# Patient Record
Sex: MALE | Race: WHITE | NOT HISPANIC OR LATINO | Employment: OTHER | URBAN - METROPOLITAN AREA
[De-identification: names, ages, dates, MRNs, and addresses within clinical notes are randomized per-mention and may not be internally consistent; named-entity substitution may affect disease eponyms.]

---

## 2018-01-10 NOTE — PROGRESS NOTES
Discussion/Summary    PATIENT EDUCATION RECORD   Living Well with Diabetes Class 3 Education Content:Oral/Injectable medication, Prevention, Short-term complications, Long-term complications, Foot care, Sick day management (illness and stress), Travel       History of Present Illness  Patient attended Living Well with Diabetes class 3  Results/Data  Encounter Results   DSMT/MNT Time Record [id number] 12:00AM Gisell Calle     Test Name Result Flag Reference   Date of Service 5/18/2016     Start - Stop Time 1:00-3:00PM     Total MInutes 120 minutes     Group Or Individual Instruction DSMT-G       Future Appointments    Date/Time Provider Specialty Site   05/25/2016 01:00 PM Kym Mckenna RD Diabetes Educator [facility] ENDOCRINOLOGY   05/27/2016 11:20 AM SANJEEV Ng   Orthopedic Surgery [facility] ORTHO SPECIALISTS   [facility] 08[facility]     Signatures   Electronically signed by : Oniel Rondon [facility]; May 19 2016 11:29AM EST                       (Author)    Electronically signed by : SANJEEV Dahl ; May 31 2016 10:30AM EST

## 2018-01-11 NOTE — PROGRESS NOTES
Plan    1  DSMT/MNT Time Record; Status:Complete;   Done: [id number] 05:36PM    Discussion/Summary    PATIENT EDUCATION RECORD   Living Well with Diabetes Class 1 Education Content: What is diabetes, Types of diabetes, How is diabetes diagnosed, Management skills, Role of exercise, Glucose monitoring, Target range goals        History of Present Illness  Patient attended Living Well with Diabetes class 1        Results/Data  Encounter Results   DSMT/MNT Time Record [id number] 05:36PM Shade Shah     Test Name Result Flag Reference   Date of Service 5/4/2016     Start - Stop Time 1:00-3:00PM     Total MInutes 120 minutes     Group Or Individual Instruction DSMT-G       Future Appointments    Date/Time Provider Specialty Site   05/11/2016 01:00 PM Moose Laird RD Diabetes Educator [facility] ENDOCRINOLOGY   05/25/2016 01:00 PM Moose Laird RD Diabetes Educator [facility] ENDOCRINOLOGY   05/18/2016 01:00 PM Shade Shah RD, [place] Diabetes Educator [facility] ENDOCRINOLOGY   06/20/2016 08:30 AM Ariel Bellamy [facility] Endocrinology [facility] ENDOCRINOLOGY     Signatures   Electronically signed by : Ryan Pritchard; May  4 2016  5:38PM EST                       (Author)    Electronically signed by : SANJEEV Stevens ; May  5 2016  9:33AM EST

## 2018-01-11 NOTE — PROGRESS NOTES
Plan    1  DSMT/MNT Time Record; Status:Complete;   Done: [id number] 12:00AM    Discussion/Summary    PATIENT EDUCATION RECORD   Indication for Services: type 2 Diabetes Mellitus  He is ready to learn  He has no barriers to learning  Diabetes Disease Process:   He understands the pathophysiology of diabetes: Method: Instruction  Response: Verbalizes Understanding   Discussed patient's type of diabetes: Method: Instruction  Response: Verbalizes Understanding   Discussed diagnosis criteria: Method: Instruction  Response: Verbalizes Understanding   Discussed treatment goals: Method: Instruction  Response: Verbalizes Understanding   Discussed benefits of control: Method: Instruction  Response: Verbalizes Understanding   Discussed treatment options: Method: Instruction  Response: Verbalizes Understanding   Healthy Eating:   Discussed general nutrition topics: Method: Instruction  Response: Verbalizes Understanding   Being Active:   Stated the benefits of exercise: Method: Instruction  Response: Verbalizes Understanding   Discussed "exercise guidelines": Method: Instruction  Response: Verbalizes Understanding   His blood glucose targets are: Pre-meal target , Post-meal target < 180 and HS target 100-140  Monitoring:   Discussed target blood glucose ranges: Method: Instruction and Handout  Response: Verbalizes Understanding  Discussed target hemoglobin A1c: Method: Instruction  Response: Verbalizes Understanding  Discussed Finger stick testing: Method: Instruction and Demonstration  Response: Verbalizes Understanding and Returns Demonstration  Discussed proper stick techniques: Method: Instruction and Demonstration  Response: Verbalizes Understanding and Returns Demonstration  Discussed testing times: Method: Instruction  Response: Verbalizes Understanding  Discussed safe lancet disposal: Method: Instruction  Response: Verbalizes Understanding  Discussed meter : Method: Instruction  Response: Verbalizes Understanding  Discussed options for record keeping: Method: Instruction  Response: Verbalizes Understanding  Discussed reporting of readings to M D : Method: Instruction  Response: Verbalizes Understanding  He was instructed how to use the meter  He is currently using a One Touch Ultra MIni meter  Taking Medication:   Discussed Oral Medication[de-identified] Method: Instruction  Response: [facility] [employer]  Stated name,dose, and timing of oral meds  Method: Instruction  Response: [employer]  Stated side effects/precautions with diabetes meds  Method: Instruction  Response: [employer]  He is taking  biguanides and SGLT2 Inhibitor  Problem Solving: He is able to state the symptoms, prevention, and treatment of hypoglycemia, but He is not on medications that cause hypoglycemia   Hypoglycemia: Stated definition and causes: Method: Instruction  Response: Verbalizes Understanding   Described signs and symptoms: Method: Instruction and Handout  Response: Verbalizes Understanding   Discussed prevention: Method: Instruction  Response: Verbalizes Instruction   Discussed treatment: Method: Instruction  Response: Verbalizes Instruction   Hyperglycemia: Stated definition and causes: Method: Instruction  Response: Verbalizes Understanding   Described signs and symptoms: Method: Instruction and Handout  Response: Verbalizes Instruction   Reducing Risk:   Discussed long term complications- prevention, assessment, and monitoring  Method: Instruction  Response: [employer]  Discussed yearly recommended exams  Method: Instruction  Response: [employer]  Education Plan/Path:  He needs the Living Well Class  May He was given the following educational materials: Know Your Blood Glucose Numbers, The 15/15 Rule for Treating Low Blood Sugar and Hyperglycemia/Hypoglycemia         Chief Complaint  Patient with T2DM seen for class assessment per MD request       History of Present Illness  Patient will be attending Living Well with Diabetes class in May at [facility] SPECIALTY [facility]  Results/Data  Encounter Results   DSMT/MNT Time Record 25Apr2016 12:00AM Kody Thomas     Test Name Result Flag Reference   Date of Service 4/25/2016     Start - Stop Time 1:00-2:00PM     Total MInutes 60 minutes     Group Or Individual Instruction DSMT-I       Future Appointments    Date/Time Provider Specialty Site   04/27/2016 01:15 PM Kody Thomas, RD, LDN Diabetes Educator [address] ENDOCRINOLOGY   05/06/2016 01:20 PM SANJEEV Larkin   Orthopedic Surgery [facility] ORTHO SPECIALISTS   06/20/2016 08:30 AM Humberto Boggs, [address] Endocrinology [address] ENDOCRINOLOGY     Signatures   Electronically signed by : Leni Blanco; Apr 26 2016 10:55AM EST                       (Author)    Electronically signed by : SANJEEV Bell ; Apr 27 2016 11:21AM EST

## 2018-01-12 NOTE — PROGRESS NOTES
Plan    1  DSMT/MNT Time Record; Status:Complete;   Done: [id number] 12:00AM    Discussion/Summary    PATIENT EDUCATION RECORD   Indication for Services: type 2 Diabetes Mellitus  He is ready to learn  He has no barriers to learning  Healthy Eating:   Discussed general nutrition topics: Method: Instruction  Response: Verbalizes Understanding   Discussed importance of meal timing/consistency: Method: Instruction  Response: Verbalizes Understanding   Discussed nutrient types ( Cho/Fat/Protein): Method: Instruction and Handout  Response: Verbalizes Understanding   Discussed portion sizes: Method: Instruction and Handout  Response: Verbalizes Understanding   Discussed alcohol consumption: Method: Instruction  Response: Verbalizes Understanding   Discussed food label reading: Method: Instruction  Response: Verbalizes Understanding  Provided food diary and instructions on use: Method: Instruction and HandoutResponse: Verbalizes Understanding   Provided meal planning: Method: Instruction and Handout  Response: Verbalizes Understanding His current weight is 254  His keal needs are ~2000 calories  His CHO's per meal are 60 grams  He/She was provided a meal plan for: fixed carbohydrates and weight loss  Discussed weight management/weight loss: Method: Instruction  Response: Verbalizes Understanding His weight goal is Lose 10% of present body weight  Discussed fat intake and choices: Method: Instruction and Handout  Response: Verbalizes Understanding   Discussed basic carbohydrate counting: Method: Instruction and Handout  Response: Verbalizes Understanding   Being Active:   Stated the benefits of exercise: Method: Instruction  Response: Verbalizes Understanding   Discussed "exercise guidelines": Method: Instruction  Response: Verbalizes Understanding He was given the following educational materials: [employer] book, [employer] 2000 calories calories and Calorie and Carbohydrate Tracking Books/Websites/Phone Apps   Chief Complaint  Patient with T2DM seen for diet consult per MD request       History of Present Illness  Patient reports a 14 pound weight loss in the past 3 weeks  He attributes the weight loss to dietary changes (has been avoiding potato chips, larose, whole eggs, pork roll and cheese breakfast sandwiches, liverwurst, etc)  Problems identified in food recall include inconsistent carbohydrate intake at meals, excess calories coming from high fat food choices (sausage), excess calories coming from Etoh consumption, low intake of plant based foods, whole grains and low fat dairy and general lack of balance  Provided patient with 2000 calorie meal plan to assist with consistency, balance and portion control  Shireen Lua to keep his carbohydrate intake to 60 grams of CHO per meal and 15 grams per between meal snack to assist with glycemic control  Suggested he limit lean protein intake to 10 ounces a day and limit added fat to 5 servings daily to assist with lipid management and calorie control  Patient would benefit from increasing his intake of non-starchy vegetables  Discussed safe weight loss and encouraged patient to keep his weight loss to no more than 2 pounds a week  Encouraged patient to initiate regular exercise and work his way up to a minimum of 150 minutes a week  Patient agreed to keep daily food logs  Patient did not schedule follow-up at this time  He will attend Living Well with Diabetes classes in May  RD will remain available for further dietary questions/concerns  This is his initial assessment    Present at session: patient  and spouse    Medical Diagnosis/Reason for Referral:T2DM  He has no special learning needs  His caloric needs are ~2000 calories  Recent weight change: 14 pound weight loss in past 3 weeks  Spouse  shops for food  Spouse  cooks the food  Exercise routine:  Patient does not exercise beyond his daily activities     He eats breakfast at  7-7:30AM AM Egg whites, 4oz sausage, 1/2 of an orange or plum, coffee with 2% milk; was having 4 slices of larose with eggs or pork roll and cheese on a muffin or just cold cuts rolled together, coffee   He snacks at 10:00AM AM Nut[employer] granola bar, club soda; was having potato chips   He eats lunch at  12-1:00PM PM Salad with lettuce, tomato, onion, olives, turkey or ham and 2 tblsp [place] dressing, 1-2 cups soup; was having 2 slices of wheat bread with cold cuts and mustard   He snacks at 3:00PM PM 3-4 tortilla chips, salsa OR yogurt smoothie made with 1 cup yogurt, 3 large strawberries, 1/4 banana and 2 slices of apple   He eats dinner at  6:00PM PM Stir [place] veggies with chicken or shrimp or steak OR 2 chicken thighsand 1 small potato OR 1 cup wheat pasta with meatballs; was having mainly meat and potatoes  9:30PM at bedtime 1 piece of wheat toast with cream cheese or SF jam; 2x a week will have 2 shots of gin; patient was "snacking on anything around" prior to making changes     [address] and nutrition related knowledge deficit related to  lack of prior exposure to accurate nutrition related information  As evidenced by  no prior knowledge of need for food and nutrition related recommendations  Medical Nutrition Therapy Intervention: Plate Method, Carbohydrate counting, Meal planning, Strategies to reduce fat intake, Individualized meal plan, Strategies to increase the intake of plant based foods, Strategies to monitor portion control, Label reading, Meal timing, Exercise Guidelines, Behavior modification strategies and Weight/BMI Goals  His comprehension was good   His motivation was good   His compliance was good   Goals:  1  60 grams CHO per meal and 15 grams per between meal snack  2  Make low fat food choices when possible  3  Initiate aerobic exercise        Social History    · Denied: History of Alcohol use   · Daily caffeine consumption   · Never a smoker    Vitals  Signs [Data Includes: Current Encounter]   Recorded: 51Yxs9309 02:01PM   Weight: 254 lb   BMI Calculated: 37 51  BSA Calculated: 2 29    Results/Data  Encounter Results   DSMT/MNT Time Record [id number] 12:00AM Chilo Kerns     Test Name Result Flag Reference   Date of Service 4/27/2016     Start - Stop Time 1:15-2:15PM     Total MInutes 60 minutes     Group Or Individual Instruction MNT-I       Future Appointments    Date/Time Provider Specialty Site   06/20/2016 08:30 AM Stevo Valdez [facility] Endocrinology [address] [facility] ENDOCRINOLOGY     Signatures   Electronically signed by : Usha Sanchez [facility]; Apr 28 2016  3:09PM EST                       (Author)    Electronically signed by : SANJEEV Lentz ; Apr 29 2016  8:31AM EST

## 2018-01-16 NOTE — PROGRESS NOTES
Plan    1  DSMT/MNT Time Record; Status:Complete;   Done: 46TIB1194 06:00PM    Discussion/Summary    PATIENT EDUCATION RECORD   Living Well with Diabetes Class 4 Education Content: Types of cholesterol, Dietary sources of cholesterol, Types of fat, Types of fiber, Reading food labels- in depth, Healthy choices when dining out        Chief Complaint  Patient attended LWD class 4 this evening      End of Encounter Meds    1  Invokana 300 MG Oral Tablet; Take one tablet daily; Therapy: 54QVO3347 to (Evaluate:24Mar2017)  Requested for: 36RLT3109; Last   Rx:29Mar2016 Ordered   2  Lisinopril 10 MG Oral Tablet; TAKE 1 TABLET DAILY; Therapy: 20Jun2016 to (Evaluate:55Uaj8362)  Requested for: 20Jun2016; Last   Rx:20Jun2016 Ordered   3  MetFORMIN HCl - 1000 MG Oral Tablet; TAKE 1 TABLET TWICE DAILY  Requested for:   26TZF0153; Last Rx:29Mar2016 Ordered   4  OneTouch Ultra 2 w/Device Kit; Using to check sugars 3-4 times daily as directed; Therapy: 04Apr2016 to (Last Rx:06Apr2016)  Requested for: 07Apr2016 Ordered   5  OneTouch Ultra Blue In [employer]; Test 3 times daily; Therapy: 79SWB7198 to (Evaluate:69Nkn9320)  Requested for: 412 5473; Last   Rx:06Apr2016 Ordered    6  Atorvastatin Calcium 20 MG Oral Tablet; Take 1 tablet daily; Therapy: 96MCC1512 to (Mary Grace Moser)  Requested for: 86ZHT4382; Last   Rx:29Mar2016 Ordered    7  Atenolol-Chlorthalidone 50-25 MG Oral Tablet; Therapy: (Recorded:29Mar2016) to Recorded    Future Appointments    Date/Time Provider Specialty Site   10/21/2016 11:10 AM SANJEEV Galloway   Endocrinology [facility] ENDOCRINOLOGY     Signatures   Electronically signed by : Britni Marc RD; Aug 11 2016  9:19AM EST                       (Author)    Electronically signed by : Britni Marc RD; Aug 11 2016  9:21AM EST                       (Author)    Electronically signed by : SANJEEV Bell ; Aug 11 2016 10:35AM EST

## 2018-01-18 NOTE — PROGRESS NOTES
Discussion/Summary    PATIENT EDUCATION RECORD   Living Well with Diabetes Class 2 Education Content: Macronutrients, Carbohydrate sources, What one serving of carbohydrate equals, Effects of diet on blood glucose levels, effects of carbohydrates on blood glucose levels, Basics of meal planning: balance, portions, and meal times, Measurements, Reading food labels to determine carbohydrates       Chief Complaint  Eduardo Enriquez and his wife attended class this afternoon      End of Encounter Meds   1  Glimepiride 4 MG Oral Tablet; Therapy: 21TBO4496 to (Evaluate:06Nov2016) Recorded  2  Invokana 300 MG Oral Tablet; Take one tablet daily; Therapy: 55QKN6886 to (Evaluate:24Mar2017)  Requested for: 32GRQ1055; Last   Rx:29Mar2016 Ordered  3  MetFORMIN HCl - 1000 MG Oral Tablet; TAKE 1 TABLET TWICE DAILY  Requested for:   84VQD7766; Last Rx:29Mar2016 Ordered  4  OneTouch Ultra 2 w/Device Kit; Using to check sugars 3-4 times daily as directed; Therapy: 04Apr2016 to (Last Rx:06Apr2016)  Requested for: 07Apr2016 Ordered  5  OneTouch Ultra Blue In [employer]; Test 3 times daily; Therapy: 60RWG1450 to (Evaluate:28Wdz2886)  Requested for: 412 5473; Last   Rx:06Apr2016 Ordered   6  Oxycodone-Acetaminophen 5-325 MG Oral Tablet; 1 po q 8 prn pain; Therapy: 69Vit1590 to (Last Rx:13Apr2016) Ordered   7  Atorvastatin Calcium 20 MG Oral Tablet; Take 1 tablet daily; Therapy: 60DHP9475 to (Amarjit Good)  Requested for: 99OHX8409; Last   Rx:29Mar2016 Ordered   8  Atenolol-Chlorthalidone 50-25 MG Oral Tablet; Therapy: (Page Kuo) to Recorded  9  MetFORMIN HCl TABS;    Therapy: (Recorded:09Apr2016) to Recorded    Future Appointments    Date/Time Provider Specialty Site   05/25/2016 01:00 PM Carmen Anton RD Diabetes Educator [address] ENDOCRINOLOGY   05/18/2016 01:00 PM Cheo Stiles RD, LDN Diabetes Educator [facility] ENDOCRINOLOGY   06/20/2016 08:30 AM Nahid Jackson [facility] Endocrinology [facility] ENDOCRINOLOGY Signatures   Electronically signed by : Majo Echeverria RD; May 11 2016  4:08PM EST                       (Author)    Electronically signed by : SANJEEV Mrorison ; May 11 2016  9:10PM EST                       (Author)    Electronically signed by : SANJEEV Morrison ; May 11 2016  9:10PM EST                       (Author)

## 2018-06-28 ENCOUNTER — TRANSCRIBE ORDERS (OUTPATIENT)
Dept: ADMINISTRATIVE | Facility: HOSPITAL | Age: 73
End: 2018-06-28

## 2018-06-28 DIAGNOSIS — J01.00 ACUTE MAXILLARY SINUSITIS, RECURRENCE NOT SPECIFIED: Primary | ICD-10-CM

## 2018-07-03 ENCOUNTER — HOSPITAL ENCOUNTER (OUTPATIENT)
Dept: CT IMAGING | Facility: HOSPITAL | Age: 73
Discharge: HOME | End: 2018-07-03
Attending: FAMILY MEDICINE
Payer: MEDICARE

## 2018-07-03 DIAGNOSIS — J01.00 ACUTE MAXILLARY SINUSITIS, RECURRENCE NOT SPECIFIED: ICD-10-CM

## 2018-07-03 PROCEDURE — 70486 CT MAXILLOFACIAL W/O DYE: CPT

## 2018-09-06 ENCOUNTER — APPOINTMENT (OUTPATIENT)
Dept: RADIOLOGY | Facility: CLINIC | Age: 73
End: 2018-09-06
Payer: MEDICARE

## 2018-09-06 ENCOUNTER — TRANSCRIBE ORDERS (OUTPATIENT)
Dept: URGENT CARE | Facility: CLINIC | Age: 73
End: 2018-09-06

## 2018-09-06 DIAGNOSIS — R07.81 PLEURODYNIA: ICD-10-CM

## 2018-09-06 DIAGNOSIS — R07.81 PLEURODYNIA: Primary | ICD-10-CM

## 2018-09-06 PROCEDURE — 71046 X-RAY EXAM CHEST 2 VIEWS: CPT

## 2018-09-06 PROCEDURE — 71110 X-RAY EXAM RIBS BIL 3 VIEWS: CPT

## 2019-03-16 ENCOUNTER — TRANSCRIBE ORDERS (OUTPATIENT)
Dept: URGENT CARE | Facility: CLINIC | Age: 74
End: 2019-03-16

## 2019-03-16 ENCOUNTER — APPOINTMENT (OUTPATIENT)
Dept: RADIOLOGY | Facility: CLINIC | Age: 74
End: 2019-03-16
Payer: MEDICARE

## 2019-03-16 DIAGNOSIS — M54.41 ACUTE BACK PAIN WITH SCIATICA, RIGHT: Primary | ICD-10-CM

## 2019-03-16 DIAGNOSIS — M54.41 ACUTE BACK PAIN WITH SCIATICA, RIGHT: ICD-10-CM

## 2019-03-16 PROCEDURE — 72100 X-RAY EXAM L-S SPINE 2/3 VWS: CPT

## 2021-06-18 ENCOUNTER — APPOINTMENT (OUTPATIENT)
Dept: RADIOLOGY | Facility: CLINIC | Age: 76
End: 2021-06-18
Payer: COMMERCIAL

## 2021-06-18 ENCOUNTER — OFFICE VISIT (OUTPATIENT)
Dept: URGENT CARE | Facility: CLINIC | Age: 76
End: 2021-06-18
Payer: COMMERCIAL

## 2021-06-18 VITALS
RESPIRATION RATE: 18 BRPM | TEMPERATURE: 97.8 F | HEART RATE: 73 BPM | OXYGEN SATURATION: 96 % | SYSTOLIC BLOOD PRESSURE: 158 MMHG | DIASTOLIC BLOOD PRESSURE: 72 MMHG

## 2021-06-18 DIAGNOSIS — R07.81 RIB PAIN ON RIGHT SIDE: ICD-10-CM

## 2021-06-18 DIAGNOSIS — S22.31XA CLOSED FRACTURE OF ONE RIB OF RIGHT SIDE, INITIAL ENCOUNTER: Primary | ICD-10-CM

## 2021-06-18 DIAGNOSIS — S00.81XA ABRASION OF FOREHEAD, INITIAL ENCOUNTER: ICD-10-CM

## 2021-06-18 DIAGNOSIS — S40.011A CONTUSION OF RIGHT SHOULDER, INITIAL ENCOUNTER: ICD-10-CM
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
## 2021-06-18 PROCEDURE — 99213 OFFICE O/P EST LOW 20 MIN: CPT | Performed by: NURSE PRACTITIONER

## 2021-06-18 PROCEDURE — 71101 X-RAY EXAM UNILAT RIBS/CHEST: CPT

## 2021-06-18 NOTE — PROGRESS NOTES
[facility]        NAME: Melva Carter is a 78 y o  male  : 1945    MRN: [id number]  DATE: 2021  TIME: 10:44 AM    Assessment and Plan   Closed fracture of one rib of right side, initial encounter [S22 31XA]  1  Closed fracture of one rib of right side, initial encounter     2  Rib pain on right side  XR ribs right w pa chest min 3 views   3  Abrasion of forehead, initial encounter     4  Contusion of right shoulder, initial encounter     5  Fall, initial encounter           Patient Instructions       Follow up with PCP in 3-5 days  Proceed to  ER if symptoms worsen  Your xray was preliminarily read by your provider  A radiologist will read the xray and you will be notified  One rib fracture was identified today by your provider  You have been given an incentive spirometer to help keep your lungs open  You have refused pain medication  Take tylenol as needed  You are to see your PCP   Go to the ED if symptoms worsen  Chief Complaint     Chief Complaint   Patient presents with    Rib Pain     Right side rib pain after falling a week ago  History of Present Illness       This is a 78year old male who states 1 week ago was weed waking, set the weed ping down and tripped over it  He states he hit his head on macadam   He states he hit his right shoulder and right lateral side  He states his head and shoulder are fine and does not need them looked at  He states that he is having right rib pain and has pain with coughing and taking in a deep breath  He denies medical treatment after fall  He states he takes a baby Asprin daily  Review of Systems   Review of Systems   Constitutional: Negative  HENT: Negative  Eyes: Negative  Respiratory: Negative  Cardiovascular: Negative  Endocrine: Negative  Genitourinary: Negative  Musculoskeletal:        Right rib pain    Skin: Positive for color change  Allergic/Immunologic: Negative  Neurological: Negative  Hematological: Negative  Psychiatric/Behavioral: Negative  Current Medications       Current Outpatient Medications:     ATENOLOL-CHLORTHALIDONE PO, Take 25-50 mg by mouth daily  , Disp: , Rfl:     Canagliflozin (INVOKANA PO), Take by mouth daily  Patient unsure of dosage  , Disp: , Rfl:     HYDROcodone-acetaminophen (NORCO) 5-325 mg per tablet, Take 1 tablet by mouth every 6 (six) hours as needed for moderate pain  Max Daily Amount: 4 tablets, Disp: 36 tablet, Rfl: 0    METFORMIN HCL PO, Take by mouth 2 (two) times a day , Disp: , Rfl:     Current Allergies     Allergies as of 06/18/2021 - Reviewed 06/18/2021   Allergen Reaction Noted    Other  04/09/2016            The following portions of the patient's history were reviewed and updated as appropriate: allergies, current medications, past family history, past medical history, past social history, past surgical history and problem list      Past Medical History:   Diagnosis Date    Diabetes mellitus ([facility] [address] )     Hypertension        Past Surgical History:   Procedure Laterality Date    SHOULDER SURGERY         History reviewed  No pertinent family history  Medications have been verified  Objective   /72   Pulse 73   Temp 97 8 °F (36 6 °C)   Resp 18   SpO2 96%   No LMP for male patient  Physical Exam     Physical Exam  Vitals and nursing note reviewed  Constitutional:       General: He is not in acute distress  Appearance: Normal appearance  He is obese  He is not ill-appearing, toxic-appearing or diaphoretic  HENT:      Head:        Right Ear: Tympanic membrane and ear canal normal       Left Ear: Tympanic membrane and ear canal normal       Nose: Nose normal       Mouth/Throat:      Mouth: Mucous membranes are moist    Eyes:      General: No visual field deficit  Extraocular Movements: Extraocular movements intact     Cardiovascular:      Rate and Rhythm: Normal rate and regular rhythm  Heart sounds: Normal heart sounds  Pulmonary:      Effort: Pulmonary effort is normal  No respiratory distress  Breath sounds: Normal breath sounds  No stridor  No wheezing, rhonchi or rales  Chest:      Chest wall: No tenderness  Abdominal:      Palpations: Abdomen is soft  Comments: Obese      Musculoskeletal:         General: Tenderness present  Normal range of motion  Arms:       Cervical back: Normal range of motion  Comments: FROM right shoulder     Skin:     General: Skin is warm  Capillary Refill: Capillary refill takes less than 2 seconds  Findings: Bruising present  Neurological:      General: No focal deficit present  Mental Status: He is alert and oriented to person, place, and time  GCS: GCS eye subscore is 4  GCS verbal subscore is 5  GCS motor subscore is 6  Cranial Nerves: Cranial nerves are intact  No cranial nerve deficit, dysarthria or facial asymmetry  Sensory: Sensation is intact  No sensory deficit  Motor: Motor function is intact  No weakness, tremor, atrophy, abnormal muscle tone, seizure activity or pronator drift  Coordination: Coordination is intact  Romberg sign negative  Coordination normal  Finger-Nose-Finger Test and Heel to [place] ben Test normal  Rapid alternating movements normal       Gait: Gait is intact  Gait and tandem walk normal    Psychiatric:         Mood and Affect: Mood normal          Behavior: Behavior normal          Thought Content: Thought content normal          Judgment: Judgment normal                Preliminary reading of xray  + rib fracture appears to be #8 - difficult to see all ribs due to body habitus and bowel, gas  Waiting on rad read     Pt offered pain medication and he refused  He denied need for CT head or xray shoulder - he states they are not painful   Normal neuro exam

## 2021-06-18 NOTE — PATIENT INSTRUCTIONS
Your xray was preliminarily read by your provider  A radiologist will read the xray and you will be notified  One rib fracture was identified today by your provider  You have been given an incentive spirometer to help keep your lungs open  You have refused pain medication  Take tylenol as needed  You are to see your PCP   Go to the ED if symptoms worsen  Rib Fracture   WHAT YOU NEED TO KNOW:   A rib fracture is a crack or break in a rib bone  Rib fractures usually heal within 6 weeks  You should be able to return to your usual activities before that time  DISCHARGE INSTRUCTIONS:   Call your local emergency number (911 in the [address]) if:   · You have trouble breathing  · You have new or increased pain  Return to the emergency department if:   · Your pain does not get better, even after treatment  · You have a fever or a cough  Call your doctor if:   · You have questions or concerns about your condition or care  Medicines: You may need any of the following:  · NSAIDs , such as ibuprofen, help decrease swelling, pain, and fever  This medicine is available with or without a doctor's order  NSAIDs can cause stomach bleeding or kidney problems in certain people  If you take blood thinner medicine, always ask your healthcare provider if NSAIDs are safe for you  Always read the medicine label and follow directions  · Prescription pain medicine  may be given  Ask your healthcare provider how to take this medicine safely  Some prescription pain medicines contain acetaminophen  Do not take other medicines that contain acetaminophen without talking to your healthcare provider  Too much acetaminophen may cause liver damage  Prescription pain medicine may cause constipation  Ask your healthcare provider how to prevent or treat constipation  · Take your medicine as directed  Contact your healthcare provider if you think your medicine is not helping or if you have side effects   Tell him or her if you are allergic to any medicine  Keep a list of the medicines, vitamins, and herbs you take  Include the amounts, and when and why you take them  Bring the list or the pill bottles to follow-up visits  Carry your medicine list with you in case of an emergency  Self-care:   · Take deep breaths and cough 10 times each hour  This will decrease your risk for a lung infection  Hug a pillow on your injured side to decrease pain while you take deep breaths  Take a deep breath and hold it for as long as you can  Let the air out and then cough  Deep breaths help open your airway  You may be given an incentive spirometer to help you take deep breaths  Put the plastic piece in your mouth and take a slow, deep breath, then let the air out and cough  Repeat these steps 10 times every hour  · Rest and limit activity as directed  Do not pull, push, or lift objects  Start to do more as your pain decreases  Ask your healthcare provider how much activity you can do  · Apply ice  on your chest near your fractured rib for 15 to 20 minutes every hour or as directed  Use an ice pack, or put crushed ice in a plastic bag  Cover it with a towel  Ice helps prevent tissue damage and decreases swelling and pain  Follow up with your doctor as directed:  Write down your questions so you remember to ask them during your visits  © Copyright [address] Information is for End User's use only and may not be sold, redistributed or otherwise used for commercial purposes  All illustrations and images included in CareNotes® are the copyrighted property of [employer] D [employer]  or [facility] Leelee Kulkarni   The above information is an  only  It is not intended as medical advice for individual conditions or treatments  Talk to your doctor, nurse or pharmacist before following any medical regimen to see if it is safe and effective for you